# Patient Record
(demographics unavailable — no encounter records)

---

## 2024-10-15 NOTE — HISTORY OF PRESENT ILLNESS
[Postpartum Consultation] : postpartum consultation [Delivery Date: ___] : on [unfilled] [Complications:___] : no complications [BF with Difficulty] : nursing without difficulty [Postpartum Follow Up] : postpartum follow up [Repeat C/S] : delivered by  section (repeat) [Male] : Delivery History: baby boy [Wt. ___] : weighing [unfilled] [Boy] : baby is a boy [Breastfeeding] : currently nursing [Discharge HCT: ___] : hematocrit level was [unfilled] [Discharge HGB: ___] : hemoglobin level was [unfilled] [Resumed Menses] : has not resumed her menses [Resumed [place]] : has not resumed intercourse [Intended Contraception] : Intended Contraception: [IUD] : intrauterine device [None] : The patient is currently asymptomatic [Clean/Dry/Intact] : clean, dry and intact [Erythema] : not erythematous [Swelling] : not swollen [Dehiscence] : not dehisced [Doing Well] : is doing well [No Sign of Infection] : is showing no signs of infection [Excellent Pain Control] : has excellent pain control [No [place]] : to avoid sexual intercourse [Limited ADLs] : to participate in activities of daily living with limitations [No Work] : not to work [No Housework] : not to do housework [No Sports] : not to participate in sports [FreeTextEntry9] : uncomplicated PN history [de-identified] : wound check, incision well healed CDI, no erythema, no abd pain, abd soft, non distended [de-identified] : 4 wk revisit

## 2024-11-12 NOTE — HISTORY OF PRESENT ILLNESS
[Breastfeeding] : currently nursing [BF with Difficulty] : nursing without difficulty [Discharge HCT: ___] : hematocrit level was [unfilled] [Discharge HGB: ___] : hemoglobin level was [unfilled] [Intended Contraception] : Intended Contraception: [IUD] : intrauterine device [S/Sx PP Depression] : no signs/symptoms of postpartum depression [Clean/Dry/Intact] : clean, dry and intact [Erythema] : not erythematous [Swelling] : not swollen [Dehiscence] : not dehisced [Back to Normal] : is back to normal in size [None] : no vaginal bleeding [Examination Of The Breasts] : breasts are normal [Doing Well] : is doing well [No Sign of Infection] : is showing no signs of infection [de-identified] : repeat elective C/S [de-identified] : routine PP exam , anatomy restored, Paragard desired [de-identified] : Vaginitis swab done, breast pump ordered, IUD counseled, follow up in 1 wk for Paragard placement, [place], no pregnancy risk at this time.  Resume all ADLs after 2 months PP

## 2024-11-19 NOTE — PROCEDURE
[IUD Placement] : intrauterine device (IUD) placement [Consent Obtained] : Consent obtained [Prevention of Pregnancy] : prevention of pregnancy [Risks] : risks [Benefits] : benefits [Alternatives] : alternatives [Patient] : patient [Infection] : infection [Bleeding] : bleeding [Pain] : pain [Expulsion] : expulsion [Failure] : failure [Uterine Perforation] : uterine perforation [Neg Pregnancy Test] : negative pregnancy test [Neg GC/Chlamydia] : negative GC/Chlamydia [History of Unprotected [place]] : no history of unprotected intercourse [No Premedication] : No premedication [Tenaculum] : Tenaculum [Easy Passage] : Easy passage [Sounded to ___ cm] : sounded to [unfilled] ~Ucm [Post Placement Transvag. US] : post placement transvaginal ultrasound [ParaGard IUD] : ParaGard IUD [Tolerated Well] : Patient tolerated the procedure well [No Complications] : No complications [None] : None [LMPDate] : N/A [de-identified] : [id number] [de-identified] : 1/2030 [de-identified] : 11/2034

## 2024-11-19 NOTE — PLAN
[FreeTextEntry1] : s/s post insertion infection reviewed with pt 72 hr post insertion abstinence advised 2 month revisit for IUD check